# Patient Record
Sex: MALE | Race: WHITE | NOT HISPANIC OR LATINO | Employment: OTHER
[De-identification: names, ages, dates, MRNs, and addresses within clinical notes are randomized per-mention and may not be internally consistent; named-entity substitution may affect disease eponyms.]

---

## 2022-03-25 ENCOUNTER — TRANSCRIBE ORDERS (OUTPATIENT)
Dept: OTHER | Age: 62
End: 2022-03-25
Payer: COMMERCIAL

## 2022-03-25 DIAGNOSIS — M54.50 LEFT LOW BACK PAIN: Primary | ICD-10-CM

## 2022-03-25 DIAGNOSIS — M48.061 LUMBAR STENOSIS: ICD-10-CM

## 2022-03-29 ENCOUNTER — THERAPY VISIT (OUTPATIENT)
Dept: PHYSICAL THERAPY | Facility: CLINIC | Age: 62
End: 2022-03-29
Payer: COMMERCIAL

## 2022-03-29 DIAGNOSIS — M54.50 LEFT LOW BACK PAIN: ICD-10-CM

## 2022-03-29 DIAGNOSIS — M48.061 LUMBAR STENOSIS: ICD-10-CM

## 2022-03-29 PROCEDURE — 97110 THERAPEUTIC EXERCISES: CPT | Mod: GP | Performed by: PHYSICAL THERAPIST

## 2022-03-29 PROCEDURE — 97161 PT EVAL LOW COMPLEX 20 MIN: CPT | Mod: GP | Performed by: PHYSICAL THERAPIST

## 2022-03-29 PROCEDURE — 97140 MANUAL THERAPY 1/> REGIONS: CPT | Mod: GP | Performed by: PHYSICAL THERAPIST

## 2022-03-29 NOTE — PROGRESS NOTES
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     Problem began: 2/1/2022 (recent flareup-10+ years off and on).   Problem occurred: unknown   Pain is reported as 4/10 on pain scale.  General health as reported by patient is fair.  Pertinent medical history includes: concussions/dizziness, heart problems, history of fractures, numbness/tingling, osteoarthritis, overweight, rheumatoid arthritis and sleep disorder/apnea.   Red flags:  Calf pain-swelling-warmth, foot drop and pain at rest/night.  Medical allergies: none.    Other surgery history details: left knee; leg veins; 3 right foot fractures.    Current medications:  Anti-inflammatory, cardiac medication, muscle relaxants and steroids.    Current occupation is self employed.   Primary job tasks include:  Computer work.                  Therapist Generated HPI Evaluation  Problem details: Patient has chief complaint of left low back and hip pain that started Feb 1, 2022 when he woke up with it. He has the most pain when he wakes and is getting up. Pain gradually lessens as he is up and moving around. He was started on muscle relaxants and chiropractic care, with some improvement in hip. He was then started on prednisone 4 days ago, with moderate improvement since then.  XR left hip: mild bilateral hip joint space narrowing;XR lumbar spine: moderate lumbar spondylosis. Mild multilevel disc space narrowing with marginal osteophytes.XR left knee: mild narrowing of medial knee compartment, moderate PF degenerative change with femoral spurring, small enthesophytes of upper and lower pole of patella. Patient walks 2.5 miles/day .         Type of problem:  Lumbar.    This is a recurrent condition.  Condition occurred with:  Insidious onset (woke up with it).  Where condition occurred: at home.  Patient reports pain:  Lumbar spine left.  Pain is described as aching, shooting and sharp and  is intermittent.  Pain radiates to:  Gluteals left, thigh left, knee left and lower leg left. Pain is worse in the A.M..  Since onset symptoms are rapidly improving.  Associated symptoms:  Loss of motion/stiffness and loss of strength. Symptoms are exacerbated by lying down (getting out of bed)      Previous treatment includes chiropractic. There was mild improvement following previous treatment.  Barriers include:  None as reported by patient.                        Objective:  Standing Alignment:        Lumbar:  Lordosis decr                           Lumbar/SI Evaluation  ROM:    AROM Lumbar:   Flexion:            60%  Ext:                    30%   Side Bend:        Left:  30%    Right:  30%  Rotation:           Left:  60%    Right:  60%  Side Glide:        Left:     Right:         Strength: lumbar=3/5  Lumbar Myotomes:    T12-L3 (Hip Flex):  Left: 5-    Right: 5  L2-4 (Quads):  Left:  5-    Right:  5  L4 (Ankle DF):  Left:  5    Right:  3    S1 (Toe Raise):  Left: 5    Right: 4+      Lumbar Dermtomes:            L4 Right:  Hypo-light touch  L5 Right:  Hypo-light touch    Neural Tension/Mobility:      Left side:SLR; SLR w/DF or Slump  negative.     Right side:   SLR w/DF; Slump or SLR  negative.   Lumbar Palpation:    Tenderness present at Left:    Quadratus Lumborum; Erector Spinae; Piriformis; PSIS and Hip Flexors  Tenderness present at Right: Quadratus Lumborum; Erector Spinae and Piriformis    Lumbar Provocation:    Left positive with:  PROM hip  Right positive with: PROM hip                                                 General     ROS    Assessment/Plan:    Patient is a 62 year old male with lumbar complaints.    Patient has the following significant findings with corresponding treatment plan.                Diagnosis 1:  Left lumbar radiculopathy  Pain -  hot/cold therapy and education  Decreased ROM/flexibility - manual therapy, therapeutic exercise and home program  Decreased strength - therapeutic  exercise, therapeutic activities and home program    Therapy Evaluation Codes:   1) History comprised of:   Personal factors that impact the plan of care:      None.    Comorbidity factors that impact the plan of care are:      None.     Medications impacting care: None.  2) Examination of Body Systems comprised of:   Body structures and functions that impact the plan of care:      Lumbar spine.   Activity limitations that impact the plan of care are:      Lifting, Walking and Sleeping.  3) Clinical presentation characteristics are:   Stable/Uncomplicated.  4) Decision-Making    Low complexity using standardized patient assessment instrument and/or measureable assessment of functional outcome.  Cumulative Therapy Evaluation is: Low complexity.    Previous and current functional limitations:  (See Goal Flow Sheet for this information)    Short term and Long term goals: (See Goal Flow Sheet for this information)     Communication ability:  Patient appears to be able to clearly communicate and understand verbal and written communication and follow directions correctly.  Treatment Explanation - The following has been discussed with the patient:   RX ordered/plan of care  Anticipated outcomes  Possible risks and side effects  This patient would benefit from PT intervention to resume normal activities.   Rehab potential is excellent.    Frequency:  2 X week, once daily  Duration:  for 4 weeks  Discharge Plan:  Achieve all LTG.  Independent in home treatment program.  Reach maximal therapeutic benefit.    Please refer to the daily flowsheet for treatment today, total treatment time and time spent performing 1:1 timed codes.

## 2022-03-29 NOTE — LETTER
[facility]  [address]  [facility] [address]  [phone]    2022    Re: Dragan Weinberg   :   1960  MRN:  [id number]   REFERRING PHYSICIAN:   Dylon MAR [facility]    Date of Initial Evaluation: 3/30/22  Visits:  Rxs Used: 1  Reason for Referral:     Left low back pain  Lumbar stenosis    EVALUATION SUMMARY    Physical Therapy Initial Evaluation  Subjective:    Patient Health History  Dragan Weinberg being seen for low back and hip.     Problem began: 2022 (recent flareup-10+ years off and on).   Problem occurred: unknown   Pain is reported as 4/10 on pain scale.  General health as reported by patient is fair.  Pertinent medical history includes: concussions/dizziness, heart problems, history of fractures, numbness/tingling, osteoarthritis, overweight, rheumatoid arthritis and sleep disorder/apnea.   Red flags:  Calf pain-swelling-warmth, foot drop and pain at rest/night.  Medical allergies: none.    Other surgery history details: left knee; leg veins; 3 right foot fractures.    Current medications:  Anti-inflammatory, cardiac medication, muscle relaxants and steroids.    Current occupation is self employed.   Primary job tasks include:  Computer work.                  Therapist Generated HPI Evaluation  Problem details: Patient has chief complaint of left low back and hip pain that started 2022 when he woke up with it. He has the most pain when he wakes and is getting up. Pain gradually lessens as he is up and moving around. He was started on muscle relaxants and chiropractic care, with some improvement in hip. He was then started on prednisone 4 days ago, with moderate improvement since then.  XR left hip: mild bilateral hip joint space narrowing;XR lumbar spine: moderate lumbar spondylosis. Mild multilevel disc space narrowing with marginal osteophytes.XR left knee: mild narrowing of  medial knee compartment, moderate PF degenerative change with femoral spurring, small enthesophytes of upper and lower pole of patella. Patient walks 2.5 miles/day .        Re: Dragan Thomasdada   :   1960     Type of problem:  Lumbar.    This is a recurrent condition.  Condition occurred with:  Insidious onset (woke up with it).  Where condition occurred: at home.  Patient reports pain:  Lumbar spine left.  Pain is described as aching, shooting and sharp and is intermittent.  Pain radiates to:  Gluteals left, thigh left, knee left and lower leg left. Pain is worse in the A.M..  Since onset symptoms are rapidly improving.  Associated symptoms:  Loss of motion/stiffness and loss of strength. Symptoms are exacerbated by lying down (getting out of bed)      Previous treatment includes chiropractic. There was mild improvement following previous treatment.  Barriers include:  None as reported by patient.  Objective:  Standing Alignment:      Lumbar:  Lordosis decr     Lumbar/SI Evaluation  ROM:    AROM Lumbar:   Flexion:            60%  Ext:                    30%   Side Bend:        Left:  30%    Right:  30%  Rotation:           Left:  60%    Right:  60%  Side Glide:        Left:     Right:         Strength: lumbar=3/5  Lumbar Myotomes:    T12-L3 (Hip Flex):  Left: 5-    Right: 5  L2-4 (Quads):  Left:  5-    Right:  5  L4 (Ankle DF):  Left:  5    Right:  3    S1 (Toe Raise):  Left: 5    Right: 4+    Lumbar Dermtomes:      L4 Right:  Hypo-light touch  L5 Right:  Hypo-light touch    Neural Tension/Mobility:      Left side:SLR; SLR w/DF or Slump  negative.     Right side:   SLR w/DF; Slump or SLR  negative.   Lumbar Palpation:    Tenderness present at Left:    Quadratus Lumborum; Erector Spinae; Piriformis; PSIS  Re: Dragan Llanesgstad   :   1960  and Hip Flexors  Tenderness present at Right: Quadratus Lumborum; Erector Spinae and Piriformis    Lumbar Provocation:    Left positive with:  PROM hip  Right  positive with: PROM hip    Assessment/Plan:    Patient is a 62 year old male with lumbar complaints.    Patient has the following significant findings with corresponding treatment plan.                Diagnosis 1:  Left lumbar radiculopathy  Pain -  hot/cold therapy and education  Decreased ROM/flexibility - manual therapy, therapeutic exercise and home program  Decreased strength - therapeutic exercise, therapeutic activities and home program    Therapy Evaluation Codes:   1) History comprised of:   Personal factors that impact the plan of care:      None.    Comorbidity factors that impact the plan of care are:      None.     Medications impacting care: None.  2) Examination of Body Systems comprised of:   Body structures and functions that impact the plan of care:      Lumbar spine.   Activity limitations that impact the plan of care are:      Lifting, Walking and Sleeping.  3) Clinical presentation characteristics are:   Stable/Uncomplicated.  4) Decision-Making    Low complexity using standardized patient assessment instrument and/or measureable assessment of functional outcome.  Cumulative Therapy Evaluation is: Low complexity.    Previous and current functional limitations:  (See Goal Flow Sheet for this information)    Short term and Long term goals: (See Goal Flow Sheet for this information)     Communication ability:  Patient appears to be able to clearly communicate and understand verbal and written communication and follow directions correctly.  Treatment Explanation - The following has been discussed with the patient:   RX ordered/plan of care  Anticipated outcomes  Possible risks and side effects  This patient would benefit from PT intervention to resume normal activities.   Rehab potential is excellent.    Frequency:  2 X week, once daily  Duration:  for 4 weeks  Discharge Plan:  Achieve all LTG.  Independent in home treatment program.  Reach maximal therapeutic benefit.     Thank you for your  referral.  Re: Dragan Weinberg   :   1960    INQUIRIES  Therapist: LILI SARGENT [facility]  [id number] [place] [id number]  Phone: [phone]  Fax: [phone]

## 2022-03-30 PROBLEM — M48.061 LUMBAR STENOSIS: Status: ACTIVE | Noted: 2022-03-30

## 2022-03-30 PROBLEM — M54.50 LEFT LOW BACK PAIN: Status: ACTIVE | Noted: 2022-03-30

## 2022-04-05 ENCOUNTER — THERAPY VISIT (OUTPATIENT)
Dept: PHYSICAL THERAPY | Facility: CLINIC | Age: 62
End: 2022-04-05
Payer: COMMERCIAL

## 2022-04-05 DIAGNOSIS — M48.061 SPINAL STENOSIS OF LUMBAR REGION, UNSPECIFIED WHETHER NEUROGENIC CLAUDICATION PRESENT: ICD-10-CM

## 2022-04-05 DIAGNOSIS — M54.50 LEFT LOW BACK PAIN: Primary | ICD-10-CM

## 2022-04-05 PROCEDURE — 97110 THERAPEUTIC EXERCISES: CPT | Mod: GP | Performed by: PHYSICAL THERAPIST

## 2022-04-05 PROCEDURE — 97140 MANUAL THERAPY 1/> REGIONS: CPT | Mod: GP | Performed by: PHYSICAL THERAPIST

## 2022-04-07 ENCOUNTER — TRANSCRIBE ORDERS (OUTPATIENT)
Dept: OTHER | Age: 62
End: 2022-04-07
Payer: COMMERCIAL

## 2022-04-12 ENCOUNTER — THERAPY VISIT (OUTPATIENT)
Dept: PHYSICAL THERAPY | Facility: CLINIC | Age: 62
End: 2022-04-12
Payer: COMMERCIAL

## 2022-04-12 DIAGNOSIS — M54.50 LEFT LOW BACK PAIN: Primary | ICD-10-CM

## 2022-04-12 DIAGNOSIS — M48.061 SPINAL STENOSIS OF LUMBAR REGION, UNSPECIFIED WHETHER NEUROGENIC CLAUDICATION PRESENT: ICD-10-CM

## 2022-04-12 PROCEDURE — 97110 THERAPEUTIC EXERCISES: CPT | Mod: GP | Performed by: PHYSICAL THERAPIST

## 2022-04-12 PROCEDURE — 97140 MANUAL THERAPY 1/> REGIONS: CPT | Mod: GP | Performed by: PHYSICAL THERAPIST

## 2022-04-19 ENCOUNTER — THERAPY VISIT (OUTPATIENT)
Dept: PHYSICAL THERAPY | Facility: CLINIC | Age: 62
End: 2022-04-19
Payer: COMMERCIAL

## 2022-04-19 DIAGNOSIS — M48.061 LUMBAR STENOSIS: Primary | ICD-10-CM

## 2022-04-19 DIAGNOSIS — M54.50 LEFT LOW BACK PAIN: ICD-10-CM

## 2022-04-19 PROCEDURE — 97110 THERAPEUTIC EXERCISES: CPT | Mod: GP | Performed by: PHYSICAL THERAPIST

## 2022-04-19 PROCEDURE — 97140 MANUAL THERAPY 1/> REGIONS: CPT | Mod: GP | Performed by: PHYSICAL THERAPIST

## 2022-04-26 ENCOUNTER — THERAPY VISIT (OUTPATIENT)
Dept: PHYSICAL THERAPY | Facility: CLINIC | Age: 62
End: 2022-04-26
Payer: COMMERCIAL

## 2022-04-26 DIAGNOSIS — M54.50 LEFT LOW BACK PAIN: Primary | ICD-10-CM

## 2022-04-26 DIAGNOSIS — M48.061 SPINAL STENOSIS OF LUMBAR REGION, UNSPECIFIED WHETHER NEUROGENIC CLAUDICATION PRESENT: ICD-10-CM

## 2022-04-26 PROCEDURE — 97110 THERAPEUTIC EXERCISES: CPT | Mod: GP | Performed by: PHYSICAL THERAPIST

## 2022-04-26 PROCEDURE — 97140 MANUAL THERAPY 1/> REGIONS: CPT | Mod: GP | Performed by: PHYSICAL THERAPIST

## 2022-04-26 NOTE — LETTER
[facility]  [address] [facility] [address]  [phone]    2022    Re: Dragan Weinberg   :   1960  MRN:  [id number]   REFERRING PHYSICIAN:   Dylon MAR [facility]    Date of Initial Evaluation: 22  Visits:  Rxs Used: 5  Reason for Referral:     Left low back pain  Spinal stenosis of lumbar region, unspecified whether neurogenic claudication present    EVALUATION SUMMARY    Assessment/Plan:    DISCHARGE REPORT    Progress reporting period is from 2022 to 2022.     SUBJECTIVE  Subjective: Patient reports moderate improevemnt since starting PT and no longer using muscle relaxor medication. He continues with intermittent low bacl pain with golfing, but feels that he can manage it independently with home exercises.    Current Pain level: 1/10   Initial Pain level: 410    OBJECTIVE  Objective: Lumbar ROM is WNL. Minimal tightness and tenderness right lower lumbar region. Significant improvement with flexibility and strength      ASSESSMENT/PLAN    STG/LTGs have been met or progress has been made towards goals:  Yes (See Goal flow sheet completed today.)  Assessment of Progress: The patient's condition is improving.  The patient's condition has potential to improve.  Self Management Plans:  Patient is independent in a home treatment program.  Patient is independent in self management of symptoms.    Dragan continues to require the following intervention to meet STG and LTG's: PT intervention is no longer required to meet STG/LTG.    Re: Dragan Weinberg   :   1960    Recommendations:    This patient is ready to be discharged from therapy and continue their home treatment program.    Thank you for your referral.    INQUIRIES  Therapist: LILI SARGENT [facility]  [id number] [place] [id number]  Phone: [phone]  Fax:  [phone]

## 2022-04-27 NOTE — PROGRESS NOTES
Subjective:  HPI  Physical Exam                    Objective:  System    Physical Exam    General     ROS    Assessment/Plan:    DISCHARGE REPORT    Progress reporting period is from 4/5/2022 to 4/26/2022.     SUBJECTIVE  Subjective: Patient reports moderate improevemnt since starting PT and no longer using muscle relaxor medication. He continues with intermittent low bacl pain with golfing, but feels that he can manage it independently with home exercises.    Current Pain level: 1/10   Initial Pain level: 4/10    OBJECTIVE  Objective: Lumbar ROM is WNL. Minimal tightness and tenderness right lower lumbar region. Significant improvement with flexibility and strength      ASSESSMENT/PLAN    STG/LTGs have been met or progress has been made towards goals:  Yes (See Goal flow sheet completed today.)  Assessment of Progress: The patient's condition is improving.  The patient's condition has potential to improve.  Self Management Plans:  Patient is independent in a home treatment program.  Patient is independent in self management of symptoms.    Dragan continues to require the following intervention to meet STG and LTG's: PT intervention is no longer required to meet STG/LTG.    Recommendations:    This patient is ready to be discharged from therapy and continue their home treatment program.    Please refer to the daily flowsheet for treatment today, total treatment time and time spent performing 1:1 timed codes.